# Patient Record
Sex: FEMALE | Race: WHITE | Employment: FULL TIME | URBAN - METROPOLITAN AREA
[De-identification: names, ages, dates, MRNs, and addresses within clinical notes are randomized per-mention and may not be internally consistent; named-entity substitution may affect disease eponyms.]

---

## 2021-08-02 ENCOUNTER — APPOINTMENT (OUTPATIENT)
Dept: GENERAL RADIOLOGY | Age: 31
End: 2021-08-02

## 2021-08-02 ENCOUNTER — HOSPITAL ENCOUNTER (EMERGENCY)
Age: 31
Discharge: HOME | End: 2021-08-02
Attending: EMERGENCY MEDICINE

## 2021-08-02 VITALS
TEMPERATURE: 98.9 F | OXYGEN SATURATION: 95 % | DIASTOLIC BLOOD PRESSURE: 93 MMHG | RESPIRATION RATE: 18 BRPM | SYSTOLIC BLOOD PRESSURE: 149 MMHG | BODY MASS INDEX: 44.15 KG/M2 | WEIGHT: 265 LBS | HEART RATE: 88 BPM | HEIGHT: 65 IN

## 2021-08-02 DIAGNOSIS — J18.9 PNEUMONIA DUE TO INFECTIOUS ORGANISM, UNSPECIFIED LATERALITY, UNSPECIFIED PART OF LUNG: Primary | ICD-10-CM

## 2021-08-02 DIAGNOSIS — Z20.822 SUSPECTED COVID-19 VIRUS INFECTION: ICD-10-CM

## 2021-08-02 LAB
BACTERIA: NEGATIVE /HPF
BILIRUBIN URINE: NEGATIVE MG/DL
BLOOD, URINE: NEGATIVE
CAST TYPE: ABNORMAL /HPF
CLARITY: CLEAR
COLOR: YELLOW
CRYSTAL TYPE: NEGATIVE /HPF
EPITHELIAL CELLS, UA: 3 /HPF
GLUCOSE BLD-MCNC: 162 MG/DL
GLUCOSE BLD-MCNC: 162 MG/DL (ref 70–99)
GLUCOSE, URINE: NEGATIVE MG/DL
INTERPRETATION: NORMAL
KETONES, URINE: 5 MG/DL
LEUKOCYTE ESTERASE, URINE: ABNORMAL
NITRITE URINE, QUANTITATIVE: NEGATIVE
PH, URINE: 6 (ref 5–8)
PREGNANCY, URINE: NEGATIVE
PROTEIN UA: NEGATIVE MG/DL
RBC URINE: ABNORMAL /HPF (ref 0–6)
SPECIFIC GRAVITY UA: 1.01 (ref 1–1.03)
SPECIFIC GRAVITY, URINE: 1.01 (ref 1–1.03)
UROBILINOGEN, URINE: 0.2 MG/DL (ref 0.2–1)
WBC UA: 1 /HPF (ref 0–5)

## 2021-08-02 PROCEDURE — 81025 URINE PREGNANCY TEST: CPT

## 2021-08-02 PROCEDURE — 81001 URINALYSIS AUTO W/SCOPE: CPT

## 2021-08-02 PROCEDURE — 82962 GLUCOSE BLOOD TEST: CPT

## 2021-08-02 PROCEDURE — 6370000000 HC RX 637 (ALT 250 FOR IP): Performed by: EMERGENCY MEDICINE

## 2021-08-02 PROCEDURE — 71045 X-RAY EXAM CHEST 1 VIEW: CPT

## 2021-08-02 PROCEDURE — 99283 EMERGENCY DEPT VISIT LOW MDM: CPT

## 2021-08-02 RX ORDER — NAPROXEN 500 MG/1
500 TABLET ORAL 2 TIMES DAILY PRN
Qty: 60 TABLET | Refills: 0 | Status: SHIPPED | OUTPATIENT
Start: 2021-08-02

## 2021-08-02 RX ORDER — ONDANSETRON 4 MG/1
4 TABLET, ORALLY DISINTEGRATING ORAL ONCE
Status: COMPLETED | OUTPATIENT
Start: 2021-08-02 | End: 2021-08-02

## 2021-08-02 RX ORDER — NAPROXEN 500 MG/1
500 TABLET ORAL ONCE
Status: COMPLETED | OUTPATIENT
Start: 2021-08-02 | End: 2021-08-02

## 2021-08-02 RX ORDER — ONDANSETRON 4 MG/1
4 TABLET, ORALLY DISINTEGRATING ORAL EVERY 8 HOURS PRN
Qty: 20 TABLET | Refills: 0 | Status: SHIPPED | OUTPATIENT
Start: 2021-08-02

## 2021-08-02 RX ORDER — DOXYCYCLINE HYCLATE 100 MG
100 TABLET ORAL 2 TIMES DAILY
Qty: 20 TABLET | Refills: 0 | Status: SHIPPED | OUTPATIENT
Start: 2021-08-02 | End: 2021-08-12

## 2021-08-02 RX ORDER — DOXYCYCLINE HYCLATE 100 MG
100 TABLET ORAL ONCE
Status: COMPLETED | OUTPATIENT
Start: 2021-08-02 | End: 2021-08-02

## 2021-08-02 RX ADMIN — DOXYCYCLINE HYCLATE 100 MG: 100 TABLET, COATED ORAL at 22:38

## 2021-08-02 RX ADMIN — NAPROXEN 500 MG: 500 TABLET ORAL at 21:54

## 2021-08-02 RX ADMIN — ONDANSETRON 4 MG: 4 TABLET, ORALLY DISINTEGRATING ORAL at 21:54

## 2021-08-02 ASSESSMENT — PAIN SCALES - GENERAL
PAINLEVEL_OUTOF10: 9
PAINLEVEL_OUTOF10: 6

## 2021-08-03 NOTE — ED NOTES
The patient was discharged to home in stable condition. All discharge instructions, medications and follow up appointments were reviewed with the patient and or significant other. Any and all concerns if voiced were addressed. The patient was instructed to return for worsening symptoms and any other concerns.         Reddy Martinez RN  08/02/21 [id number]

## 2021-08-03 NOTE — ED PROVIDER NOTES
[address] 55  Chief Complaint   Patient presents with    Cough    Fever     HISTORY OF PRESENT ILLNESS  Angie Fernando is a 33 y.o. female who presents to the ED complaining of cough malaise and fevers over the past 4-5 days. Here with significant other with similar sx. Son also has similar sx. She reports a history of bronchitis and this feels similar. She has a headache. Reports temp of 102-103F range waxing and waning, tends to be worse in the evening.  +nausea, dry-mouth, changes in taste, poor appetite. Diabetic, unsure how her sugars are doing. Mild cough. No urinary complaints but has some body aches including some low back pain. No other complaints, modifying factors or associated symptoms. Nursing notes reviewed. Past Medical History:   Diagnosis Date    Diabetes mellitus ([facility] [address]      Past Surgical History:   Procedure Laterality Date    TONSILLECTOMY       No family history on file. Social History     Socioeconomic History    Marital status:      Spouse name: Not on file    Number of children: Not on file    Years of education: Not on file    Highest education level: Not on file   Occupational History    Not on file   Tobacco Use    Smoking status: Current Every Day Smoker     Types: Cigarettes    Smokeless tobacco: Never Used   Substance and Sexual Activity    Alcohol use: Yes    Drug use: Not on file    Sexual activity: Not on file   Other Topics Concern    Not on file   Social History Narrative    Not on file     Social Determinants of Health     Financial Resource Strain:     Difficulty of Paying Living Expenses:    Food Insecurity:     Worried About Running Out of Food in the Last Year:     [address] in the Last Year:    Transportation Needs:     Lack of Transportation (Medical):      Lack of Transportation (Non-Medical):    Physical Activity:     Days of Exercise per Week:     Minutes of Exercise per Session:    Stress:     Feeling of Stress :    Social Connections:     Frequency of Communication with Friends and Family:     Frequency of Social Gatherings with Friends and Family:     Attends Judaism Services:     Active Member of Clubs or Organizations:     Attends Club or Organization Meetings:     Marital Status:    Intimate Partner Violence:     Fear of Current or Ex-Partner:     Emotionally Abused:     Physically Abused:     Sexually Abused:      Current Facility-Administered Medications   Medication Dose Route Frequency Provider Last Rate Last Admin    doxycycline hyclate (VIBRA-TABS) tablet 100 mg  100 mg Oral Once Elisha Zuniga MD         Current Outpatient Medications   Medication Sig Dispense Refill    naproxen (NAPROSYN) 500 MG tablet Take 1 tablet by mouth 2 times daily as needed for Pain 60 tablet 0    ondansetron (ZOFRAN ODT) 4 MG disintegrating tablet Take 1 tablet by mouth every 8 hours as needed for Nausea or Vomiting 20 tablet 0    doxycycline hyclate (VIBRA-TABS) 100 MG tablet Take 1 tablet by mouth 2 times daily for 10 days 20 tablet 0     Allergies   Allergen Reactions    Amoxapine And Related     Keflex [Cephalexin]        REVIEW OF SYSTEMS  6 systems reviewed, pertinent positives per HPI otherwise noted to be negative    PHYSICAL EXAM   BP (!) 149/93   Pulse 88   Temp 98.9 °F (37.2 °C) (Infrared)   Resp 18   Ht 5' 5\" (1.651 m)   Wt 265 lb (120.2 kg)   SpO2 95%   BMI 44.10 kg/m²    GENERAL APPEARANCE: Awake and alert. Cooperative. No acute distress. HEAD: Normocephalic. Atraumatic. EYES: PERRL. EOM's grossly intact. ENT: Mucous membranes are dry. Oropharynx clear, no erythema/exudate. Nasal congestion noted. NECK: Supple. Normal ROM. CHEST: Equal symmetric chest rise. RRR. LUNGS: Breathing is unlabored. Speaking comfortably in full sentences. CTAB, but coughing noted, no wheezes rhonchi or rales.   ABDOMEN: Nondistended, nontender  EXTREMITIES: MAEE. No acute deformities. SKIN: Warm and dry. No acute rashes. NEUROLOGICAL: Alert and oriented. Strength is 5/5 in all extremities and sensation is intact. RADIOLOGY    XR CHEST PORTABLE    Result Date: 8/2/2021  EXAMINATION: ONE XRAY VIEW OF THE CHEST 8/2/2021 6:54 pm COMPARISON: None. HISTORY: ORDERING SYSTEM PROVIDED HISTORY: fevers cough TECHNOLOGIST PROVIDED HISTORY: Reason for exam:->fevers cough Reason for Exam: fever and cough Acuity: Acute Type of Exam: Initial FINDINGS: Subtle patchy opacities are seen within the left lower lung. There are low lung volumes with secondary bronchovascular crowding. Elevation of the right hemidiaphragm noted. No pneumothorax. No free air. Subtle patchy opacities in the left lower lung, raising the possibility of pneumonia. Consider both typical and atypical etiologies, including viral pneumonia. LABS  I have reviewed all labs for this visit.    Results for orders placed or performed during the hospital encounter of 08/02/21   Urinalysis Reflex to Culture    Specimen: Urine   Result Value Ref Range    Color, UA YELLOW YELLOW    Clarity, UA CLEAR CLEAR    Glucose, Urine NEGATIVE NEGATIVE MG/DL    Bilirubin Urine NEGATIVE NEGATIVE MG/DL    Ketones, Urine 5 (A) NEGATIVE MG/DL    Specific Gravity, UA 1.015 1.001 - 1.035    Blood, Urine NEGATIVE NEGATIVE    pH, Urine 6.0 5.0 - 8.0    Protein, UA NEGATIVE NEGATIVE MG/DL    Urobilinogen, Urine 0.2 0.2 - 1.0 MG/DL    Nitrite Urine, Quantitative NEGATIVE NEGATIVE    Leukocyte Esterase, Urine TRACE (A) NEGATIVE    RBC, UA NO CELLS SEEN 0 - 6 /HPF    WBC, UA 1 0 - 5 /HPF    Epithelial Cells, UA 3 /HPF    Cast Type NO CAST FORMS SEEN NO CAST FORMS SEEN /HPF    Bacteria, UA NEGATIVE NEGATIVE /HPF    Crystal Type NEGATIVE NEGATIVE /HPF   Pregnancy, Urine   Result Value Ref Range    Pregnancy, Urine NEGATIVE NEGATIVE    Specific Gravity, Urine 1.015 1.001 - 1.035    Interpretation HCG METHOD LIMITATIONS:    POC Blood Glucose   Result Value Ref Range    Glucose 162 mg/dL   POCT Glucose   Result Value Ref Range    POC Glucose 162 (H) 70 - 99 MG/DL     ED COURSE/MDM  The patient's ED course was notable for high clinical suspicion for COVID-19, with pneumonia on chest x-ray and symptomatology consistent with viral syndrome. Patient is not septic or hypoxic though. She wants a conservative approach regarding diagnostics. Of note the patient states that they \"do not believe in Covid testing\" and adamantly refuses Covid swabs when I recommended this and talked about a high clinical suspicion that the patient and significant other are both positive. However they still refuse testing for this illness. I did suggest that the patient presume he may be positive and follow CDC guidelines for isolation even if they refused testing. The  ultimately was agreeable to getting swabbed even though this patient was not. I did advise that if the patient's significant other had a positive test that she should presume herself to be positive as well. Patient is instructed to return to the ED with any new or worsening symptoms, follow-up with primary care, and will be prescribed medications for symptomatic management as well as doxycycline for PNA coverage. UA without UTI. Hcg neg. Patient was given scripts for the following medications. I counseled patient how to take these medications. New Prescriptions    DOXYCYCLINE HYCLATE (VIBRA-TABS) 100 MG TABLET    Take 1 tablet by mouth 2 times daily for 10 days    NAPROXEN (NAPROSYN) 500 MG TABLET    Take 1 tablet by mouth 2 times daily as needed for Pain    ONDANSETRON (ZOFRAN ODT) 4 MG DISINTEGRATING TABLET    Take 1 tablet by mouth every 8 hours as needed for Nausea or Vomiting         CLINICAL IMPRESSION  1. Pneumonia due to infectious organism, unspecified laterality, unspecified part of lung    2.  Suspected COVID-19 virus infection        Blood pressure (!) 149/93, pulse 88, temperature 98.9 °F (37.2 °C), temperature source Infrared, resp. rate 18, height 5' 5\" (1.651 m), weight 265 lb (120.2 kg), SpO2 95 %. DISPOSITION    I have discussed the findings of today's workup with the patient and addressed the patient's questions and concerns. Important warning signs as well as new or worsening symptoms which would necessitate immediate return to the ED were discussed. The plan is to discharge from the ED at this time, and the patient is in stable condition. The patient acknowledged understanding is agreeable with this plan. Follow-up with:  Joe Morales MD  [address] [place] [address]  [phone]    Schedule an appointment as soon as possible for a visit in 1 week  For symptom re-evaluation    [place] [address] [address]  [phone]  Go to   If symptoms worsen      This chart was created using Dragon dictation software. Efforts were made by me to ensure accuracy, however some errors may be present due to limitations of this technology.         Cuca Chaudhary MD  08/02/21 [id number]